# Patient Record
Sex: FEMALE
[De-identification: names, ages, dates, MRNs, and addresses within clinical notes are randomized per-mention and may not be internally consistent; named-entity substitution may affect disease eponyms.]

---

## 2022-12-01 ENCOUNTER — APPOINTMENT (OUTPATIENT)
Dept: ORTHOPEDIC SURGERY | Facility: CLINIC | Age: 42
End: 2022-12-01

## 2022-12-01 PROBLEM — Z00.00 ENCOUNTER FOR PREVENTIVE HEALTH EXAMINATION: Status: ACTIVE | Noted: 2022-12-01

## 2022-12-01 PROCEDURE — 99205 OFFICE O/P NEW HI 60 MIN: CPT

## 2022-12-01 NOTE — IMAGING
[de-identified] : LSPINE\par Palpation: No tenderness to palpation or spasm in bilateral thoracic and lumbar paraspinal musculature, no SI joint tenderness to palpation\par ROM: Full with no pain\par Strength: 5/5 bilateral hip flexors, knee extensors, ankle dorsiflexors, EHL, ankle plantarflexors\par Sensation: Sensation present to light touch bilateral L2-S1 distributions\par Provocative maneuvers: Negative bilateral straight leg raise \par \par Bilateral hips-\par Palpation: No tenderness to palpation over greater trochanter or IT band\par ROM: No pain with flexion and internal rotation

## 2022-12-01 NOTE — ASSESSMENT
[FreeTextEntry1] : Left L4/5 LR and sub-articular annular injury with LR narrowing\par \par Trial lindsey\par Gabapentin- Patient advised of sedating effects, instructed not to drive, operate machinery, or take with other sedating medications. Advised of need to taper on/off medication and risk of abruptly stopping gabapentin. \par Will discuss Pain

## 2022-12-01 NOTE — HISTORY OF PRESENT ILLNESS
[Lower back] : lower back [Gradual] : gradual [Dull/Aching] : dull/aching [Radiating] : radiating [Intermittent] : intermittent [Household chores] : household chores [Sleep] : sleep [Lying in bed] : lying in bed [de-identified] : 1/31/22 [facility] BLE- report noted in chart. \par B/L L5/S1 radic\par \par 3/18/22 [facility] Lumbar MRI  - report noted in chart. \par L1-L2: No posterior disc contour abnormality. No facet joint arthrosis. No thecal sac compression. No neural foraminal stenosis. \par L2-L3: No posterior disc contour abnormality. No facet joint arthrosis. No thecal sac compression. No neural foraminal stenosis. \par L3-L4: There is right foraminal zone annular fissure with small disc protrusion resulting in mild right neural foraminal stenosis. No facet joint arthrosis. No thecal sac compression. No left neural foraminal stenosis. \par L4-L5: There is a disc bulge with superimposed left foraminal zone annular fissure and disc protrusion as well as mild facet joint arthrosis mildly narrowing the left lateral recess with disc material impinging on the descending left L5 nerve root. No neural foraminal stenosis. \par L5-S1: No posterior disc contour abnormality. No facet joint arthrosis. No thecal sac compression. No neural foraminal stenosis.\par Ind. review- \par Left L4/5 LR and sub-articular annular injury with LR narrowing\par \par ---------------------------------------------------------\par 12/1/22- ANGELICA MORILLO is a 42 year old female here today complaining of lower back pain. onset approx, 2 years ago, gradually increasing 6 months ago. pt states she is experiencing radiating pain from lower back to LLE, in to left foot at times. pain increasing at night. taking nsaids with some relief. No bb dysfunction reported.  [] : no [de-identified] : MRI (R) and EMG

## 2023-01-12 ENCOUNTER — APPOINTMENT (OUTPATIENT)
Dept: ORTHOPEDIC SURGERY | Facility: CLINIC | Age: 43
End: 2023-01-12
Payer: MEDICAID

## 2023-01-12 DIAGNOSIS — M46.1 SACROILIITIS, NOT ELSEWHERE CLASSIFIED: ICD-10-CM

## 2023-01-12 DIAGNOSIS — M51.36 OTHER INTERVERTEBRAL DISC DEGENERATION, LUMBAR REGION: ICD-10-CM

## 2023-01-12 DIAGNOSIS — M54.16 RADICULOPATHY, LUMBAR REGION: ICD-10-CM

## 2023-01-12 PROCEDURE — 99214 OFFICE O/P EST MOD 30 MIN: CPT

## 2023-01-12 RX ORDER — NAPROXEN 500 MG/1
500 TABLET ORAL
Qty: 30 | Refills: 0 | Status: ACTIVE | COMMUNITY
Start: 2023-01-12 | End: 1900-01-01

## 2023-01-12 RX ORDER — GABAPENTIN 100 MG/1
100 CAPSULE ORAL
Qty: 60 | Refills: 1 | Status: DISCONTINUED | COMMUNITY
Start: 2022-12-01 | End: 2023-01-12

## 2023-01-12 RX ORDER — PREGABALIN 75 MG/1
75 CAPSULE ORAL
Qty: 30 | Refills: 0 | Status: ACTIVE | COMMUNITY
Start: 2023-01-12 | End: 1900-01-01

## 2023-01-12 NOTE — HISTORY OF PRESENT ILLNESS
[Lower back] : lower back [Gradual] : gradual [Dull/Aching] : dull/aching [Radiating] : radiating [Intermittent] : intermittent [Household chores] : household chores [Sleep] : sleep [Lying in bed] : lying in bed [de-identified] : 1/31/22 [facility] BLE- report noted in chart. \par B/L L5/S1 radic\par \par 3/18/22 [facility] Lumbar MRI  - report noted in chart. \par L1-L2: No posterior disc contour abnormality. No facet joint arthrosis. No thecal sac compression. No neural foraminal stenosis. \par L2-L3: No posterior disc contour abnormality. No facet joint arthrosis. No thecal sac compression. No neural foraminal stenosis. \par L3-L4: There is right foraminal zone annular fissure with small disc protrusion resulting in mild right neural foraminal stenosis. No facet joint arthrosis. No thecal sac compression. No left neural foraminal stenosis. \par L4-L5: There is a disc bulge with superimposed left foraminal zone annular fissure and disc protrusion as well as mild facet joint arthrosis mildly narrowing the left lateral recess with disc material impinging on the descending left L5 nerve root. No neural foraminal stenosis. \par L5-S1: No posterior disc contour abnormality. No facet joint arthrosis. No thecal sac compression. No neural foraminal stenosis.\par Ind. review- \par Left L4/5 LR and sub-articular annular injury with LR narrowing\par \par ---------------------------------------------------------\par 12/1/22- ANGELICA MORILLO is a 42 year old female here today complaining of lower back pain. onset approx, 2 years ago, gradually increasing 6 months ago. pt states she is experiencing radiating pain from lower back to LLE, in to left foot at times. pain increasing at night. taking nsaids with some relief. No bb dysfunction reported. \par \par 1/12/23- LBP improving with PT. pain waking her up at night less frequently. Continued radiation down LLE to foot, although less severe. no b/b dysfunction.  [] : no [FreeTextEntry5] : RADIA 43 year old F here for follow up L -spine, reports improvement with PT, \par pt reports her last Rx causes her numbness on the back of her head and down her LT arm  [de-identified] : MRI (R) and EMG

## 2023-01-12 NOTE — ASSESSMENT
[FreeTextEntry1] : Left L4/5 LR and sub-articular annular injury with LR narrowing\par \par Trial pregabalin due to adverse reaction to gabapentin (N/T in UE/Occipital region; states this stopped when she D/Brandon medication)\par \par Pregabalin- Patient advised of sedating effects, instructed not to drive, operate machinery, or take with other sedating medications. Advised of need to taper on/off medication and risk of abruptly stopping pregabalin. \par \par NSAIDs- Patient warned of risk of medication to GI tract, increased blood pressure, cardiac risk, and risk of fluid retention.  Advised to clear medication with internist or PCP if any concurrent health problem with heart, blood pressure, or GI system exists.\par \par Will discuss Pain\par \par Patient seen by Dione Clayton PA-C under the supervision of  Dr. Cristóbal Curry M.D.\par

## 2023-01-12 NOTE — IMAGING
[de-identified] : LSPINE\par Palpation: midline lumbar tenderness, left SIJ tenderness, left lumbar paraspinal tenderness\par ROM: Full with no pain\par Strength: 5/5 bilateral hip flexors, knee extensors, ankle dorsiflexors, EHL, ankle plantarflexors\par Sensation: Sensation present to light touch bilateral L2-S1 distributions\par Provocative maneuvers: Negative bilateral straight leg raise \par \par Bilateral hips-\par Palpation: No tenderness to palpation over greater trochanter or IT band\par ROM: No pain with flexion and internal rotation

## 2023-02-23 ENCOUNTER — APPOINTMENT (OUTPATIENT)
Dept: ORTHOPEDIC SURGERY | Facility: CLINIC | Age: 43
End: 2023-02-23